# Patient Record
Sex: MALE | URBAN - METROPOLITAN AREA
[De-identification: names, ages, dates, MRNs, and addresses within clinical notes are randomized per-mention and may not be internally consistent; named-entity substitution may affect disease eponyms.]

---

## 2018-07-11 ENCOUNTER — HOSPITAL ENCOUNTER (OUTPATIENT)
Facility: MEDICAL CENTER | Age: 43
End: 2018-07-11
Attending: NURSE PRACTITIONER
Payer: COMMERCIAL

## 2018-07-11 LAB
ALBUMIN SERPL BCP-MCNC: 4.4 G/DL (ref 3.2–4.9)
ALBUMIN/GLOB SERPL: 1.8 G/DL
ALP SERPL-CCNC: 43 U/L (ref 30–99)
ALT SERPL-CCNC: 21 U/L (ref 2–50)
ANION GAP SERPL CALC-SCNC: 7 MMOL/L (ref 0–11.9)
AST SERPL-CCNC: 23 U/L (ref 12–45)
BILIRUB SERPL-MCNC: 1 MG/DL (ref 0.1–1.5)
BUN SERPL-MCNC: 18 MG/DL (ref 8–22)
CALCIUM SERPL-MCNC: 9.2 MG/DL (ref 8.5–10.5)
CHLORIDE SERPL-SCNC: 103 MMOL/L (ref 96–112)
CHOLEST SERPL-MCNC: 197 MG/DL (ref 100–199)
CO2 SERPL-SCNC: 28 MMOL/L (ref 20–33)
CREAT SERPL-MCNC: 0.82 MG/DL (ref 0.5–1.4)
GLOBULIN SER CALC-MCNC: 2.4 G/DL (ref 1.9–3.5)
GLUCOSE SERPL-MCNC: 97 MG/DL (ref 65–99)
HDLC SERPL-MCNC: 71 MG/DL
LDLC SERPL CALC-MCNC: 108 MG/DL
POTASSIUM SERPL-SCNC: 3.9 MMOL/L (ref 3.6–5.5)
PROT SERPL-MCNC: 6.8 G/DL (ref 6–8.2)
PSA SERPL-MCNC: 0.38 NG/ML (ref 0–4)
SODIUM SERPL-SCNC: 138 MMOL/L (ref 135–145)
TRIGL SERPL-MCNC: 90 MG/DL (ref 0–149)

## 2018-07-11 PROCEDURE — 83036 HEMOGLOBIN GLYCOSYLATED A1C: CPT

## 2018-07-11 PROCEDURE — 84153 ASSAY OF PSA TOTAL: CPT

## 2018-07-11 PROCEDURE — 80053 COMPREHEN METABOLIC PANEL: CPT

## 2018-07-11 PROCEDURE — 80061 LIPID PANEL: CPT

## 2018-07-12 LAB
EST. AVERAGE GLUCOSE BLD GHB EST-MCNC: 111 MG/DL
HBA1C MFR BLD: 5.5 % (ref 0–5.6)

## 2020-02-13 ENCOUNTER — HOSPITAL ENCOUNTER (OUTPATIENT)
Dept: LAB | Facility: MEDICAL CENTER | Age: 45
End: 2020-02-13
Attending: NURSE PRACTITIONER
Payer: COMMERCIAL

## 2020-02-13 LAB
ALBUMIN SERPL BCP-MCNC: 4.3 G/DL (ref 3.2–4.9)
ALBUMIN/GLOB SERPL: 1.3 G/DL
ALP SERPL-CCNC: 47 U/L (ref 30–99)
ALT SERPL-CCNC: 25 U/L (ref 2–50)
ANION GAP SERPL CALC-SCNC: 10 MMOL/L (ref 0–11.9)
AST SERPL-CCNC: 29 U/L (ref 12–45)
BILIRUB SERPL-MCNC: 0.9 MG/DL (ref 0.1–1.5)
BUN SERPL-MCNC: 16 MG/DL (ref 8–22)
CALCIUM SERPL-MCNC: 9.5 MG/DL (ref 8.5–10.5)
CHLORIDE SERPL-SCNC: 103 MMOL/L (ref 96–112)
CHOLEST SERPL-MCNC: 217 MG/DL (ref 100–199)
CO2 SERPL-SCNC: 27 MMOL/L (ref 20–33)
CREAT SERPL-MCNC: 0.85 MG/DL (ref 0.5–1.4)
GLOBULIN SER CALC-MCNC: 3.2 G/DL (ref 1.9–3.5)
GLUCOSE SERPL-MCNC: 99 MG/DL (ref 65–99)
HDLC SERPL-MCNC: 49 MG/DL
LDLC SERPL CALC-MCNC: 146 MG/DL
POTASSIUM SERPL-SCNC: 4 MMOL/L (ref 3.6–5.5)
PROT SERPL-MCNC: 7.5 G/DL (ref 6–8.2)
SODIUM SERPL-SCNC: 140 MMOL/L (ref 135–145)
TRIGL SERPL-MCNC: 108 MG/DL (ref 0–149)

## 2020-02-13 PROCEDURE — 84153 ASSAY OF PSA TOTAL: CPT

## 2020-02-13 PROCEDURE — 84154 ASSAY OF PSA FREE: CPT

## 2020-02-13 PROCEDURE — 80061 LIPID PANEL: CPT

## 2020-02-13 PROCEDURE — 80053 COMPREHEN METABOLIC PANEL: CPT

## 2020-02-15 LAB
PSA FREE MFR SERPL: 33 %
PSA FREE SERPL-MCNC: 0.1 NG/ML
PSA SERPL-MCNC: 0.3 NG/ML (ref 0–4)

## 2020-02-20 ENCOUNTER — HOSPITAL ENCOUNTER (OUTPATIENT)
Facility: MEDICAL CENTER | Age: 45
End: 2020-02-20
Attending: PHYSICIAN ASSISTANT
Payer: COMMERCIAL

## 2020-02-20 PROCEDURE — 87086 URINE CULTURE/COLONY COUNT: CPT

## 2020-02-23 LAB
BACTERIA UR CULT: NORMAL
SIGNIFICANT IND 70042: NORMAL
SITE SITE: NORMAL
SOURCE SOURCE: NORMAL

## 2022-03-04 ENCOUNTER — HOSPITAL ENCOUNTER (OUTPATIENT)
Facility: MEDICAL CENTER | Age: 47
End: 2022-03-04
Payer: COMMERCIAL

## 2022-03-04 PROCEDURE — 82274 ASSAY TEST FOR BLOOD FECAL: CPT

## 2022-03-07 LAB — AMBIGUOUS SPECIMEN AMBIS: NORMAL

## 2022-03-09 LAB — IMM ASSAY OCC BLD FITOB: NEGATIVE

## 2022-12-12 ENCOUNTER — HOSPITAL ENCOUNTER (OUTPATIENT)
Facility: MEDICAL CENTER | Age: 47
End: 2022-12-12
Attending: STUDENT IN AN ORGANIZED HEALTH CARE EDUCATION/TRAINING PROGRAM
Payer: COMMERCIAL

## 2022-12-12 PROCEDURE — 80053 COMPREHEN METABOLIC PANEL: CPT

## 2022-12-12 PROCEDURE — 83036 HEMOGLOBIN GLYCOSYLATED A1C: CPT

## 2022-12-12 PROCEDURE — 80061 LIPID PANEL: CPT

## 2022-12-13 LAB
ALBUMIN SERPL BCP-MCNC: 4.6 G/DL (ref 3.2–4.9)
ALBUMIN/GLOB SERPL: 1.8 G/DL
ALP SERPL-CCNC: 46 U/L (ref 30–99)
ALT SERPL-CCNC: 22 U/L (ref 2–50)
ANION GAP SERPL CALC-SCNC: 10 MMOL/L (ref 7–16)
AST SERPL-CCNC: 21 U/L (ref 12–45)
BILIRUB SERPL-MCNC: 0.6 MG/DL (ref 0.1–1.5)
BUN SERPL-MCNC: 12 MG/DL (ref 8–22)
CALCIUM SERPL-MCNC: 9.6 MG/DL (ref 8.5–10.5)
CHLORIDE SERPL-SCNC: 104 MMOL/L (ref 96–112)
CHOLEST SERPL-MCNC: 233 MG/DL (ref 100–199)
CO2 SERPL-SCNC: 25 MMOL/L (ref 20–33)
CREAT SERPL-MCNC: 0.77 MG/DL (ref 0.5–1.4)
EST. AVERAGE GLUCOSE BLD GHB EST-MCNC: 114 MG/DL
GFR SERPLBLD CREATININE-BSD FMLA CKD-EPI: 111 ML/MIN/1.73 M 2
GLOBULIN SER CALC-MCNC: 2.6 G/DL (ref 1.9–3.5)
GLUCOSE SERPL-MCNC: 96 MG/DL (ref 65–99)
HBA1C MFR BLD: 5.6 % (ref 4–5.6)
HDLC SERPL-MCNC: 61 MG/DL
LDLC SERPL CALC-MCNC: 152 MG/DL
POTASSIUM SERPL-SCNC: 4.5 MMOL/L (ref 3.6–5.5)
PROT SERPL-MCNC: 7.2 G/DL (ref 6–8.2)
SODIUM SERPL-SCNC: 139 MMOL/L (ref 135–145)
TRIGL SERPL-MCNC: 101 MG/DL (ref 0–149)

## 2024-12-30 ENCOUNTER — HOSPITAL ENCOUNTER (OUTPATIENT)
Facility: MEDICAL CENTER | Age: 49
End: 2024-12-30
Attending: NURSE PRACTITIONER
Payer: COMMERCIAL

## 2024-12-30 PROCEDURE — 83036 HEMOGLOBIN GLYCOSYLATED A1C: CPT

## 2024-12-30 PROCEDURE — 85007 BL SMEAR W/DIFF WBC COUNT: CPT

## 2024-12-30 PROCEDURE — 85027 COMPLETE CBC AUTOMATED: CPT

## 2024-12-30 PROCEDURE — 80053 COMPREHEN METABOLIC PANEL: CPT

## 2024-12-30 PROCEDURE — 80061 LIPID PANEL: CPT

## 2024-12-31 LAB
ALBUMIN SERPL BCP-MCNC: 4.5 G/DL (ref 3.2–4.9)
ALBUMIN/GLOB SERPL: 1.7 G/DL
ALP SERPL-CCNC: 47 U/L (ref 30–99)
ALT SERPL-CCNC: 37 U/L (ref 2–50)
ANION GAP SERPL CALC-SCNC: 13 MMOL/L (ref 7–16)
AST SERPL-CCNC: 19 U/L (ref 12–45)
BASOPHILS # BLD AUTO: 0 % (ref 0–1.8)
BASOPHILS # BLD: 0 K/UL (ref 0–0.12)
BILIRUB SERPL-MCNC: 0.4 MG/DL (ref 0.1–1.5)
BUN SERPL-MCNC: 13 MG/DL (ref 8–22)
BURR CELLS BLD QL SMEAR: NORMAL
CALCIUM ALBUM COR SERPL-MCNC: 8.7 MG/DL (ref 8.5–10.5)
CALCIUM SERPL-MCNC: 9.1 MG/DL (ref 8.5–10.5)
CHLORIDE SERPL-SCNC: 104 MMOL/L (ref 96–112)
CHOLEST SERPL-MCNC: 253 MG/DL (ref 100–199)
CO2 SERPL-SCNC: 24 MMOL/L (ref 20–33)
COMMENT NL1176: NORMAL
CREAT SERPL-MCNC: 0.65 MG/DL (ref 0.5–1.4)
EOSINOPHIL # BLD AUTO: 0.23 K/UL (ref 0–0.51)
EOSINOPHIL NFR BLD: 3.3 % (ref 0–6.9)
ERYTHROCYTE [DISTWIDTH] IN BLOOD BY AUTOMATED COUNT: 52.6 FL (ref 35.9–50)
EST. AVERAGE GLUCOSE BLD GHB EST-MCNC: 117 MG/DL
GFR SERPLBLD CREATININE-BSD FMLA CKD-EPI: 115 ML/MIN/1.73 M 2
GLOBULIN SER CALC-MCNC: 2.7 G/DL (ref 1.9–3.5)
GLUCOSE SERPL-MCNC: 80 MG/DL (ref 65–99)
HBA1C MFR BLD: 5.7 % (ref 4–5.6)
HCT VFR BLD AUTO: 53.1 % (ref 42–52)
HDLC SERPL-MCNC: 50 MG/DL
HGB BLD-MCNC: 16.6 G/DL (ref 14–18)
LDLC SERPL CALC-MCNC: 158 MG/DL
LYMPHOCYTES # BLD AUTO: 4.91 K/UL (ref 1–4.8)
LYMPHOCYTES NFR BLD: 69.1 % (ref 22–41)
MANUAL DIFF BLD: NORMAL
MCH RBC QN AUTO: 32 PG (ref 27–33)
MCHC RBC AUTO-ENTMCNC: 31.3 G/DL (ref 32.3–36.5)
MCV RBC AUTO: 102.5 FL (ref 81.4–97.8)
MONOCYTES # BLD AUTO: 0.3 K/UL (ref 0–0.85)
MONOCYTES NFR BLD AUTO: 4.2 % (ref 0–13.4)
MORPHOLOGY BLD-IMP: NORMAL
MYELOCYTES NFR BLD MANUAL: 1.7 %
NEUTROPHILS # BLD AUTO: 1.54 K/UL (ref 1.82–7.42)
NEUTROPHILS NFR BLD: 21.7 % (ref 44–72)
NRBC # BLD AUTO: 0 K/UL
NRBC BLD-RTO: 0 /100 WBC (ref 0–0.2)
PLATELET # BLD AUTO: 190 K/UL (ref 164–446)
PLATELET BLD QL SMEAR: NORMAL
PMV BLD AUTO: 11.7 FL (ref 9–12.9)
POIKILOCYTOSIS BLD QL SMEAR: NORMAL
POTASSIUM SERPL-SCNC: 4.6 MMOL/L (ref 3.6–5.5)
PROT SERPL-MCNC: 7.2 G/DL (ref 6–8.2)
RBC # BLD AUTO: 5.18 M/UL (ref 4.7–6.1)
RBC BLD AUTO: PRESENT
SODIUM SERPL-SCNC: 141 MMOL/L (ref 135–145)
TRIGL SERPL-MCNC: 227 MG/DL (ref 0–149)
WBC # BLD AUTO: 7.1 K/UL (ref 4.8–10.8)

## 2025-02-06 ENCOUNTER — HOSPITAL ENCOUNTER (OUTPATIENT)
Dept: HEMATOLOGY ONCOLOGY | Facility: MEDICAL CENTER | Age: 50
End: 2025-02-06
Attending: INTERNAL MEDICINE
Payer: COMMERCIAL

## 2025-02-06 VITALS
SYSTOLIC BLOOD PRESSURE: 112 MMHG | RESPIRATION RATE: 14 BRPM | HEART RATE: 72 BPM | OXYGEN SATURATION: 94 % | WEIGHT: 215 LBS | TEMPERATURE: 97.3 F | DIASTOLIC BLOOD PRESSURE: 70 MMHG

## 2025-02-06 DIAGNOSIS — R89.9 ABNORMAL LABORATORY TEST: ICD-10-CM

## 2025-02-06 PROCEDURE — 99242 OFF/OP CONSLTJ NEW/EST SF 20: CPT | Performed by: INTERNAL MEDICINE

## 2025-02-06 PROCEDURE — 99212 OFFICE O/P EST SF 10 MIN: CPT | Performed by: INTERNAL MEDICINE

## 2025-02-06 ASSESSMENT — ENCOUNTER SYMPTOMS
HEADACHES: 0
MEMORY LOSS: 0
BRUISES/BLEEDS EASILY: 0
NECK PAIN: 0
NAUSEA: 0
FEVER: 0
SORE THROAT: 0
ABDOMINAL PAIN: 0
WEIGHT LOSS: 0
HEARTBURN: 0
SPUTUM PRODUCTION: 0
VOMITING: 0
COUGH: 0
DEPRESSION: 0
BLURRED VISION: 0
ORTHOPNEA: 0
PALPITATIONS: 0
CHILLS: 0
WHEEZING: 0
FOCAL WEAKNESS: 0
TINGLING: 0
DIZZINESS: 0
SHORTNESS OF BREATH: 0
SENSORY CHANGE: 0
TREMORS: 0

## 2025-02-06 ASSESSMENT — FIBROSIS 4 INDEX: FIB4 SCORE: 0.82

## 2025-02-06 ASSESSMENT — PAIN SCALES - GENERAL: PAINLEVEL_OUTOF10: NO PAIN

## 2025-02-06 NOTE — PROGRESS NOTES
Consult:  Hematology/Oncology      Referring Physician: =  Primary Care:  MELISA Cartwright    Diagnosis:     Chief Complaint:  New Patient (Neutropenia/)      History of Presenting Illness:  Chiki Montgomery is a 50 y.o. male referred by primary care for abnormal labs.      No past medical history on file.    No past surgical history on file.          No family history on file.    Allergies as of 02/06/2025    (No Known Allergies)       No current outpatient medications on file.    Review of Systems:  Review of Systems   Constitutional:  Negative for chills, fever, malaise/fatigue and weight loss.   HENT:  Negative for congestion, ear pain, nosebleeds and sore throat.    Eyes:  Negative for blurred vision.   Respiratory:  Negative for cough, sputum production, shortness of breath and wheezing.    Cardiovascular:  Negative for chest pain, palpitations, orthopnea and leg swelling.   Gastrointestinal:  Negative for abdominal pain, heartburn, nausea and vomiting.   Genitourinary:  Negative for dysuria, frequency and urgency.   Musculoskeletal:  Negative for neck pain.   Neurological:  Negative for dizziness, tingling, tremors, sensory change, focal weakness and headaches.   Endo/Heme/Allergies:  Does not bruise/bleed easily.   Psychiatric/Behavioral:  Negative for depression, memory loss and suicidal ideas.    All other systems reviewed and are negative.         Physical Exam:  Vitals:    02/06/25 1509   BP: 112/70   BP Location: Right arm   Patient Position: Sitting   BP Cuff Size: Adult   Pulse: 72   Resp: 14   Temp: 36.3 °C (97.3 °F)   TempSrc: Temporal   SpO2: 94%   Weight: 97.5 kg (215 lb)               Physical Exam  Constitutional:       General: He is not in acute distress.  HENT:      Head: Normocephalic.   Eyes:      Conjunctiva/sclera: Conjunctivae normal.   Cardiovascular:      Rate and Rhythm: Normal rate and regular rhythm.      Heart sounds: Normal heart sounds.   Pulmonary:      Effort: Pulmonary  effort is normal.      Breath sounds: Normal breath sounds.   Abdominal:      General: Bowel sounds are normal.      Palpations: Abdomen is soft.   Musculoskeletal:         General: Normal range of motion.   Lymphadenopathy:      Cervical: No cervical adenopathy.   Skin:     General: Skin is dry.   Neurological:      Mental Status: He is oriented to person, place, and time.   Psychiatric:         Behavior: Behavior normal.          Depression Screening    Little interest or pleasure in doing things?      Feeling down, depressed , or hopeless?     Trouble falling or staying asleep, or sleeping too much?      Feeling tired or having little energy?      Poor appetite or overeating?      Feeling bad about yourself - or that you are a failure or have let yourself or your family down?     Trouble concentrating on things, such as reading the newspaper or watching television?     Moving or speaking so slowly that other people could have noticed.  Or the opposite - being so fidgety or restless that you have been moving around a lot more than usual?      Thoughts that you would be better off dead, or of hurting yourself?      Patient Health Questionnaire Score:       If depressive symptoms identified deferred to follow up visit unless specifically addressed in assesment and plan.    Labs:  No visits with results within 1 Month(s) from this visit.   Latest known visit with results is:   Hospital Outpatient Visit on 12/30/2024   Component Date Value Ref Range Status    WBC 12/30/2024 7.1  4.8 - 10.8 K/uL Final    RBC 12/30/2024 5.18  4.70 - 6.10 M/uL Final    Hemoglobin 12/30/2024 16.6  14.0 - 18.0 g/dL Final    Hematocrit 12/30/2024 53.1 (H)  42.0 - 52.0 % Final    MCV 12/30/2024 102.5 (H)  81.4 - 97.8 fL Final    MCH 12/30/2024 32.0  27.0 - 33.0 pg Final    MCHC 12/30/2024 31.3 (L)  32.3 - 36.5 g/dL Final    RDW 12/30/2024 52.6 (H)  35.9 - 50.0 fL Final    Platelet Count 12/30/2024 190  164 - 446 K/uL Final    MPV 12/30/2024  11.7  9.0 - 12.9 fL Final    Neutrophils-Polys 12/30/2024 21.70 (L)  44.00 - 72.00 % Final    Lymphocytes 12/30/2024 69.10 (H)  22.00 - 41.00 % Final    Monocytes 12/30/2024 4.20  0.00 - 13.40 % Final    Eosinophils 12/30/2024 3.30  0.00 - 6.90 % Final    Basophils 12/30/2024 0.00  0.00 - 1.80 % Final    Nucleated RBC 12/30/2024 0.00  0.00 - 0.20 /100 WBC Final    Neutrophils (Absolute) 12/30/2024 1.54 (L)  1.82 - 7.42 K/uL Final    Includes immature neutrophils, if present.    Lymphs (Absolute) 12/30/2024 4.91 (H)  1.00 - 4.80 K/uL Final    Monos (Absolute) 12/30/2024 0.30  0.00 - 0.85 K/uL Final    Eos (Absolute) 12/30/2024 0.23  0.00 - 0.51 K/uL Final    Baso (Absolute) 12/30/2024 0.00  0.00 - 0.12 K/uL Final    NRBC (Absolute) 12/30/2024 0.00  K/uL Final    Cholesterol,Tot 12/30/2024 253 (H)  100 - 199 mg/dL Final    Triglycerides 12/30/2024 227 (H)  0 - 149 mg/dL Final    HDL 12/30/2024 50  >=40 mg/dL Final    LDL 12/30/2024 158 (H)  <100 mg/dL Final    Sodium 12/30/2024 141  135 - 145 mmol/L Final    Potassium 12/30/2024 4.6  3.6 - 5.5 mmol/L Final    Chloride 12/30/2024 104  96 - 112 mmol/L Final    Co2 12/30/2024 24  20 - 33 mmol/L Final    Anion Gap 12/30/2024 13.0  7.0 - 16.0 Final    Glucose 12/30/2024 80  65 - 99 mg/dL Final    Bun 12/30/2024 13  8 - 22 mg/dL Final    Creatinine 12/30/2024 0.65  0.50 - 1.40 mg/dL Final    Calcium 12/30/2024 9.1  8.5 - 10.5 mg/dL Final    Correct Calcium 12/30/2024 8.7  8.5 - 10.5 mg/dL Final    AST(SGOT) 12/30/2024 19  12 - 45 U/L Final    ALT(SGPT) 12/30/2024 37  2 - 50 U/L Final    Alkaline Phosphatase 12/30/2024 47  30 - 99 U/L Final    Total Bilirubin 12/30/2024 0.4  0.1 - 1.5 mg/dL Final    Albumin 12/30/2024 4.5  3.2 - 4.9 g/dL Final    Total Protein 12/30/2024 7.2  6.0 - 8.2 g/dL Final    Globulin 12/30/2024 2.7  1.9 - 3.5 g/dL Final    A-G Ratio 12/30/2024 1.7  g/dL Final    Glycohemoglobin 12/30/2024 5.7 (H)  4.0 - 5.6 % Final    Comment: Increased risk for  diabetes:  5.7 -6.4%  Diabetes:  >6.4%  Glycemic control for adults with diabetes:  <7.0%    The above interpretations are per ADA guidelines.  Diagnosis  of diabetes mellitus on the basis of elevated Hemoglobin A1c  should be confirmed by repeating the Hb A1c test.      Est Avg Glucose 12/30/2024 117  mg/dL Final    Comment: The eAG calculation is based on the A1c-Derived Daily Glucose  (ADAG) study.  See the ADA's website for additional information.      GFR (CKD-EPI) 12/30/2024 115  >60 mL/min/1.73 m 2 Final    Comment: Estimated Glomerular Filtration Rate is calculated using  race neutral CKD-EPI 2021 equation per NKF-ASN recommendations.      Myelocytes 12/30/2024 1.70  % Final    Manual Diff Status 12/30/2024 PERFORMED   Final    Comment 12/30/2024 See Comment   Final    Comment: Differential performed on albumin slide due to an increased number of  smudge cells.      Peripheral Smear Review 12/30/2024 see below   Final    Comment: Due to instrument suspect flags, further review of peripheral smear is  indicated on this patient sample. This review may or may not result in  abnormal findings.      Plt Estimation 12/30/2024 Normal   Final    RBC Morphology 12/30/2024 Present   Final    Poikilocytosis 12/30/2024 1+   Final    Echinocytes 12/30/2024 1+   Final       Imaging:   All listed images below have been independently reviewed by me. I agree with the findings as summarized below:    No results found.     Pathology:      Assessment & Plan:  Assessment & Plan  Abnormal laboratory test    Orders:    CBC WITH DIFFERENTIAL; Future        Referred by primary care for abnormal labs.  Reviewed CBC.  Return in 3 months with repeat CBC.    Any questions and concerns raised by the patient were answered to the best of my ability. Thank you for allowing me to participate in the care for this patient. Please feel free to contact me for any questions or concerns.     Babak Najera M.D.

## 2025-05-05 ENCOUNTER — TELEPHONE (OUTPATIENT)
Dept: HEMATOLOGY ONCOLOGY | Facility: MEDICAL CENTER | Age: 50
End: 2025-05-05
Payer: COMMERCIAL

## 2025-05-05 NOTE — TELEPHONE ENCOUNTER
Called and left  for pt advising he gets labs today before tomorrows appt. Ok to see if not done.

## 2025-05-05 NOTE — PROGRESS NOTES
Follow Up Note:  Hematology/Oncology    Current Diagnosis : elevated HCT, MCV, lymphocytes, low neutrophil count  Date of Diagnosis: dec 2024    Chief Complaint: Patient seen today for evaluation and ongoing management of abnormal CBC.     Care Team:   MELISA Cartwright    Oncology History of Presenting Illness:   Chiki was referred to hematology clinic by his PCP for further evaluation of neutropenia, poikilocytosis, abnormal peripheral smear in Jan 2025.  Medical history is notable for obesity, history of tobacco use, prediabetes, hyperlipidemia.  He saw Dr Najera in Feb who recommended repeat CBC in 3mo.     Current Treatment: surveillance/TBD    Treatment History: None    Interval History Ezra GOMEZ:   BLAD      Allergies as of 05/06/2025    (No Known Allergies)     No current outpatient medications on file.  Review of Systems:  ROS  Physical Exam:  There were no vitals filed for this visit.  Physical Exam    Labs: reviewed with pt today   Latest Reference Range & Units 12/30/24 08:15   WBC 4.8 - 10.8 K/uL 7.1   RBC 4.70 - 6.10 M/uL 5.18   Hemoglobin 14.0 - 18.0 g/dL 16.6   Hematocrit 42.0 - 52.0 % 53.1 (H)   MCV 81.4 - 97.8 fL 102.5 (H)   MCH 27.0 - 33.0 pg 32.0   MCHC 32.3 - 36.5 g/dL 31.3 (L)   RDW 35.9 - 50.0 fL 52.6 (H)   Platelet Count 164 - 446 K/uL 190   MPV 9.0 - 12.9 fL 11.7   Neutrophils-Polys 44.00 - 72.00 % 21.70 (L)   Neutrophils (Absolute) 1.82 - 7.42 K/uL 1.54 (L)   Lymphocytes 22.00 - 41.00 % 69.10 (H)   Lymphs (Absolute) 1.00 - 4.80 K/uL 4.91 (H)   Monocytes 0.00 - 13.40 % 4.20   Monos (Absolute) 0.00 - 0.85 K/uL 0.30   Eosinophils 0.00 - 6.90 % 3.30   Eos (Absolute) 0.00 - 0.51 K/uL 0.23   Basophils 0.00 - 1.80 % 0.00   Baso (Absolute) 0.00 - 0.12 K/uL 0.00   Myelocytes % 1.70   Nucleated RBC 0.00 - 0.20 /100 WBC 0.00   NRBC (Absolute) K/uL 0.00   Plt Estimation  Normal   RBC Morphology  Present   Poikilocytosis  1+   Echinocytes  1+   Peripheral Smear Review  see below   Manual Diff  Status  PERFORMED   Comment  See Comment   Sodium 135 - 145 mmol/L 141   Potassium 3.6 - 5.5 mmol/L 4.6   Chloride 96 - 112 mmol/L 104   Co2 20 - 33 mmol/L 24   Anion Gap 7.0 - 16.0  13.0   Glucose 65 - 99 mg/dL 80   Bun 8 - 22 mg/dL 13   Creatinine 0.50 - 1.40 mg/dL 0.65   GFR (CKD-EPI) >60 mL/min/1.73 m 2 115   Calcium 8.5 - 10.5 mg/dL 9.1   Correct Calcium 8.5 - 10.5 mg/dL 8.7   AST(SGOT) 12 - 45 U/L 19   ALT(SGPT) 2 - 50 U/L 37   Alkaline Phosphatase 30 - 99 U/L 47   Total Bilirubin 0.1 - 1.5 mg/dL 0.4   Albumin 3.2 - 4.9 g/dL 4.5   Total Protein 6.0 - 8.2 g/dL 7.2   Globulin 1.9 - 3.5 g/dL 2.7   A-G Ratio g/dL 1.7   Glycohemoglobin 4.0 - 5.6 % 5.7 (H)   Estim. Avg Glu mg/dL 117   Cholesterol,Tot 100 - 199 mg/dL 253 (H)   Triglycerides 0 - 149 mg/dL 227 (H)   HDL >=40 mg/dL 50   LDL <100 mg/dL 158 (H)     Differential performed on albumin slide due to an increased number of   smudge cells.     Assessment & Plan:  1. Encounter for hematology follow-up  CBC WITH DIFFERENTIAL    LYMPHOCYTE SUBSET PANEL 5-TOTAL LYMPHOCYTE    LDH      2. Abnormal CBC        3. Abnormal laboratory test  CBC WITH DIFFERENTIAL    LYMPHOCYTE SUBSET PANEL 5-TOTAL LYMPHOCYTE    LDH          Abnormal CBC lymphocytosis, neutropenia, poikilocytosis with smudge cells: noted on CBC from December 2024 on surveillance lab work through PCP  Stability of condition: {SYMPTOM PROGRESSION:7}  Clinically, Chiki does not have any B symptoms or lymphadenopathy,   ?repeat labs?  -repeat CBC w/ diff, add LDH & flow cytometry for further evaluation    Return for Follow Up: ***      Any questions and concerns raised by the patient were answered to the best of my ability. Thank you for allowing me to participate in the care for this patient. Please feel free to contact me for any questions or concerns.   Total time spent on chart review, clinic encounter, documentation, coordination of care: 30 minutes.   Please note that this dictation was created using voice  recognition software. I have made every reasonable attempt to correct obvious errors, but I expect that there are errors of grammar and possibly content that I did not discover before finalizing the note.

## 2025-05-06 ENCOUNTER — HOSPITAL ENCOUNTER (OUTPATIENT)
Dept: HEMATOLOGY ONCOLOGY | Facility: MEDICAL CENTER | Age: 50
End: 2025-05-06
Attending: PHYSICIAN ASSISTANT
Payer: COMMERCIAL

## 2025-05-06 ENCOUNTER — TELEPHONE (OUTPATIENT)
Dept: HEMATOLOGY ONCOLOGY | Facility: MEDICAL CENTER | Age: 50
End: 2025-05-06
Payer: COMMERCIAL

## 2025-05-06 DIAGNOSIS — R79.89 ABNORMAL CBC: ICD-10-CM

## 2025-05-06 DIAGNOSIS — R89.9 ABNORMAL LABORATORY TEST: ICD-10-CM

## 2025-05-06 DIAGNOSIS — Z09 ENCOUNTER FOR HEMATOLOGY FOLLOW-UP: ICD-10-CM

## 2025-05-06 NOTE — TELEPHONE ENCOUNTER
Called and spoke to wife about pts missed appt for sagar on 5/6/25.  Pt states he was not aware of todays visit. I did r/s his appt and faxed the lab orders to [facility] on [address] Fax : [phone]    Pt was advised to call back to let us know when labs are done.

## 2025-05-20 NOTE — PROGRESS NOTES
Follow Up Note:  Hematology/Oncology    Current Diagnosis : elevated HCT, MCV, lymphocytes, low neutrophil count  Date of Diagnosis: dec 2024    Chief Complaint: Patient seen today for evaluation and ongoing management of abnormal CBC.     Care Team:   MELISA Cartwright    Oncology History of Presenting Illness:   Chiki was referred to hematology clinic by his PCP for further evaluation of neutropenia, poikilocytosis, abnormal peripheral smear in Jan 2025.  Medical history is notable for obesity, history of tobacco use, prediabetes, hyperlipidemia.  He saw Dr Najera in Feb who recommended repeat CBC in 3mo.     Current Treatment: surveillance/TBD    Treatment History: None    Interval History Ezra GOMEZ:   BLAD      Allergies as of 05/21/2025   • (No Known Allergies)     No current outpatient medications on file.  Review of Systems:  ROS  Physical Exam:  There were no vitals filed for this visit.  Physical Exam    Labs: reviewed with pt today   Latest Reference Range & Units 12/30/24 08:15   WBC 4.8 - 10.8 K/uL 7.1   RBC 4.70 - 6.10 M/uL 5.18   Hemoglobin 14.0 - 18.0 g/dL 16.6   Hematocrit 42.0 - 52.0 % 53.1 (H)   MCV 81.4 - 97.8 fL 102.5 (H)   MCH 27.0 - 33.0 pg 32.0   MCHC 32.3 - 36.5 g/dL 31.3 (L)   RDW 35.9 - 50.0 fL 52.6 (H)   Platelet Count 164 - 446 K/uL 190   MPV 9.0 - 12.9 fL 11.7   Neutrophils-Polys 44.00 - 72.00 % 21.70 (L)   Neutrophils (Absolute) 1.82 - 7.42 K/uL 1.54 (L)   Lymphocytes 22.00 - 41.00 % 69.10 (H)   Lymphs (Absolute) 1.00 - 4.80 K/uL 4.91 (H)   Monocytes 0.00 - 13.40 % 4.20   Monos (Absolute) 0.00 - 0.85 K/uL 0.30   Eosinophils 0.00 - 6.90 % 3.30   Eos (Absolute) 0.00 - 0.51 K/uL 0.23   Basophils 0.00 - 1.80 % 0.00   Baso (Absolute) 0.00 - 0.12 K/uL 0.00   Myelocytes % 1.70   Nucleated RBC 0.00 - 0.20 /100 WBC 0.00   NRBC (Absolute) K/uL 0.00   Plt Estimation  Normal   RBC Morphology  Present   Poikilocytosis  1+   Echinocytes  1+   Peripheral Smear Review  see below   Manual Diff  Status  PERFORMED   Comment  See Comment   Sodium 135 - 145 mmol/L 141   Potassium 3.6 - 5.5 mmol/L 4.6   Chloride 96 - 112 mmol/L 104   Co2 20 - 33 mmol/L 24   Anion Gap 7.0 - 16.0  13.0   Glucose 65 - 99 mg/dL 80   Bun 8 - 22 mg/dL 13   Creatinine 0.50 - 1.40 mg/dL 0.65   GFR (CKD-EPI) >60 mL/min/1.73 m 2 115   Calcium 8.5 - 10.5 mg/dL 9.1   Correct Calcium 8.5 - 10.5 mg/dL 8.7   AST(SGOT) 12 - 45 U/L 19   ALT(SGPT) 2 - 50 U/L 37   Alkaline Phosphatase 30 - 99 U/L 47   Total Bilirubin 0.1 - 1.5 mg/dL 0.4   Albumin 3.2 - 4.9 g/dL 4.5   Total Protein 6.0 - 8.2 g/dL 7.2   Globulin 1.9 - 3.5 g/dL 2.7   A-G Ratio g/dL 1.7   Glycohemoglobin 4.0 - 5.6 % 5.7 (H)   Estim. Avg Glu mg/dL 117   Cholesterol,Tot 100 - 199 mg/dL 253 (H)   Triglycerides 0 - 149 mg/dL 227 (H)   HDL >=40 mg/dL 50   LDL <100 mg/dL 158 (H)     Differential performed on albumin slide due to an increased number of   smudge cells.     Assessment & Plan:  1. Encounter for hematology follow-up        2. Abnormal laboratory test        3. ERRONEOUS ENCOUNTER--DISREGARD              Abnormal CBC lymphocytosis, neutropenia, poikilocytosis with smudge cells: noted on CBC from December 2024 on surveillance lab work through PCP  Stability of condition: {SYMPTOM PROGRESSION:7}  Clinically, Chiki does not have any B symptoms or lymphadenopathy,   ?repeat labs?  -repeat CBC w/ diff, add LDH & flow cytometry for further evaluation    Return for Follow Up: next available apt with Dr Elliott      Any questions and concerns raised by the patient were answered to the best of my ability. Thank you for allowing me to participate in the care for this patient. Please feel free to contact me for any questions or concerns.   Total time spent on chart review, clinic encounter, documentation, coordination of care: 30 minutes.   Please note that this dictation was created using voice recognition software. I have made every reasonable attempt to correct obvious errors, but I  expect that there are errors of grammar and possibly content that I did not discover before finalizing the note.       This encounter was created in error - please disregard.

## 2025-05-21 ENCOUNTER — HOSPITAL ENCOUNTER (OUTPATIENT)
Dept: HEMATOLOGY ONCOLOGY | Facility: MEDICAL CENTER | Age: 50
End: 2025-05-21
Attending: PHYSICIAN ASSISTANT
Payer: COMMERCIAL

## 2025-05-21 DIAGNOSIS — R89.9 ABNORMAL LABORATORY TEST: ICD-10-CM

## 2025-05-21 DIAGNOSIS — Z09 ENCOUNTER FOR HEMATOLOGY FOLLOW-UP: Primary | ICD-10-CM
